# Patient Record
Sex: MALE | Race: WHITE
[De-identification: names, ages, dates, MRNs, and addresses within clinical notes are randomized per-mention and may not be internally consistent; named-entity substitution may affect disease eponyms.]

---

## 2022-04-07 PROBLEM — Z00.00 ENCOUNTER FOR PREVENTIVE HEALTH EXAMINATION: Status: ACTIVE | Noted: 2022-04-07

## 2022-04-13 ENCOUNTER — APPOINTMENT (OUTPATIENT)
Dept: ORTHOPEDIC SURGERY | Facility: CLINIC | Age: 57
End: 2022-04-13

## 2022-04-20 ENCOUNTER — APPOINTMENT (OUTPATIENT)
Dept: ORTHOPEDIC SURGERY | Facility: CLINIC | Age: 57
End: 2022-04-20
Payer: MEDICAID

## 2022-04-20 VITALS — WEIGHT: 165 LBS | HEIGHT: 74 IN | BODY MASS INDEX: 21.17 KG/M2

## 2022-04-20 DIAGNOSIS — G56.21 LESION OF ULNAR NERVE, RIGHT UPPER LIMB: ICD-10-CM

## 2022-04-20 PROCEDURE — 99213 OFFICE O/P EST LOW 20 MIN: CPT

## 2022-04-20 NOTE — ASSESSMENT
[FreeTextEntry1] : - activity as tolerated.\par \par F/u PRN.\par He has f/u appointment with Dr. Zhou for his neck scheduled.\par Discussed f/u with Neurologist vs Pain Management doctor regarding medical management of burning pain.

## 2022-04-20 NOTE — IMAGING
[de-identified] : RUE\par - well healed scar s/p CuTR and CTR. no swelling.\par - atrophy of dorsal/volar forearm, thenar musculature, interossei.\par - no TTP.\par - elbow: good extension, flexion, pronation, supination.\par - hand: good EPL, very limited FPL. good finger extension, flex to full fist with asynchronous . \par -  4+/5, 1st DI 3/5, APB 1-2/5.\par - numbness of all digits and ulnar forearm. SILT to dorsal hand and radial forearm.\par - palpable radial pulse, brick cap refill all digits.\par - no triggering.

## 2022-04-20 NOTE — HISTORY OF PRESENT ILLNESS
[10] : 10 [1] : 2 [Burning] : burning [Dull/Aching] : dull/aching [Tingling] : tingling [Household chores] : household chores [Leisure] : leisure [Work] : work [Sleep] : sleep [Social interactions] : social interactions [Not working due to injury] : Work status: not working due to injury [de-identified] :  right hand, elbow surgery one year ago  [] : no [FreeTextEntry9] : tylenol pm  [de-identified] : jessica  [de-identified] : 1 year ago

## 2022-04-20 NOTE — REASON FOR VISIT
[FreeTextEntry2] : 4/20/22: 1y s/p RIGHT CTR and ulnar nerve intramuscular transposition on 4/26/21. complain of burning throughout forearm, sometimes makes it difficult to sleep. intermittent sensitivity over posterior elbow. unable to lift more than 10lbs.\par \par 10/27/21: 6mo s/p RIGHT CTR and ulnar nerve intramuscular transposition on 4/26/21. He continues to have weakness and pain/numbness to the posterior elbow when resting on it. He has been doing ADL's but has trouble holding the laundry basket due to weakness.\par \par 7/28/21: 3mo s/p RIGHT CTR and ulnar nerve intramuscular transposition on 4/26/21. reports intermittent elbow and wrist pain, sometimes with activity, sometimes at rest. still needs to elevate his RIGHT arm above his head sometimes to relieve some of the symptoms.\par \par 6/16/21: 7wks s/p RIGHT CTR and ulnar nerve intramuscular transposition on 4/26/21. mild pain, overall doing well. continues to c/o hand weakness, but the constant "banging on the funny bone at the elbow" has resolved. also c/o burning pain from upper arm radiating to forearm.\par \par 5/18/21: 3wks s/p RIGHT CTR and ulnar nerve intramuscular transposition on 4/26/21. reports intermittent pain at surgical sites. swelling and bruising has decreased. wearing compression glove. wrist brace.\par \par 5/4/21: 8d s/p RIGHT CTR and ulnar nerve intramuscular transposition on 4/26/21. pain well controlled with Tylenol. no f/c or ill sx.\par \par 3/4/21: f/u RIGHT CTS/CuTS. He presents to discuss surgery.\par \par 2/25/21: f/u RIGHT CTS/CuTS. He feels that his RIGHT hand weakness is worsening. Reports intermittent subjective swelling and tightness, and his "fingers feel like they're going to break."\par He is seeing Dr. Gamboa for his neck.\par \par 10/28/20: 54yo LHD M presents for RIGHT arm. Reports pain radiating from shoulder blade to posterior upper arm to ulnar forearm x 2mo. Associated with atrophy of arm and weakness of hand. No injury. Hx C7-T1 fusion. Saw Dr. Zhou => NCS.\par \par NCS 10/14/20 - IMPRESSION: R C7/C8/T1 radiculopathy with acute and chronic denervation. moderate R CTS. moderate C CuTS.\par - R median: DML 7.2ms, DSL 4.8ms.\par - R ulnar: 29m/s above to below elbow, 46m/s below elbow to wrist.

## 2022-04-27 ENCOUNTER — FORM ENCOUNTER (OUTPATIENT)
Age: 57
End: 2022-04-27

## 2022-05-04 ENCOUNTER — FORM ENCOUNTER (OUTPATIENT)
Age: 57
End: 2022-05-04

## 2022-05-10 ENCOUNTER — APPOINTMENT (OUTPATIENT)
Dept: ORTHOPEDIC SURGERY | Facility: CLINIC | Age: 57
End: 2022-05-10
Payer: MEDICAID

## 2022-05-10 DIAGNOSIS — Z78.9 OTHER SPECIFIED HEALTH STATUS: ICD-10-CM

## 2022-05-10 PROCEDURE — 99214 OFFICE O/P EST MOD 30 MIN: CPT

## 2022-05-10 NOTE — IMAGING
[de-identified] : Right Shoulder: Palpation of the shoulder/upper arm is as follows: no tenderness to palpation. Range of motion of the\par shoulder is as follows: There is good motion and There are no symptoms.\par  \par Left Shoulder: Range of motion of the shoulder is as follows: There is good motion and There are no symptoms. \par \par Right Elbow: Special Testing of the elbow/arm is as follows: positive Tinel's test at cubital tunnel and positive\par hyperflexion test at the elbow \par \par Left Elbow: Special Testing of the elbow/arm is as follows: negative Tinel's test at elbow \par \par Right Hand: Inspection of the hand/wrist is as follows: Intrinsic wasting Special testing of the hand/wrist is as\par follows: Reports altered sensation in thumb, IF, LF, RF +Froment sign \par \par Left Hand: Special testing of the hand/wrist is as follows: negative compression test. \par \par Neck: \par Inspection of the cervical spine is as follows: no erythema and no rashes. Palpation of the cervical spine is as\par follows: right rhomboid tenderness. Range of motion of the cervical spine is as follows: diminished range of\par motion in all planes Strength Testing for the cervical spine is as follows: Right Finger Abductors 3/5 Right\par Grasp 3/5 R WF/WE 4/5. Subjective decrease sensation to light touch at: right dorsal and ulnar forearm Left Deltoid strength 5/5, Right\par Deltoid strength 5/5, Left Biceps 5/5, Right Biceps 5/5, Left Triceps 5/5, Right Triceps 5/5, Left Wrist Flexors 5/5, Right\par Wrist Flexors 5/5, Left Finger Abductors 5/5 and Left Grasp 5/5 Neurological testing for the cervical spine is as\par follows: normal deep tendon reflexes bilateral upper extremities and negative Saxena reflex

## 2022-05-10 NOTE — ASSESSMENT
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 \par Discussed both operative and non-operative management including interventions centrally now that he has undergone RUE peripheral decompression. \par Patient would like to avoid additional cervical/thoracic surgery at all costs. \par Agree with Hand in that peripheral decompression may not address the entirety of his issues\par Discussed possibility he may have underlying N/T, pain, and weakness that would not be entirely addressed by\par peripheral decompression, and that C-T spine surgery may still be an option to address the above\par Discussed Pain referral should he have flair of radicular pain

## 2022-05-10 NOTE — HISTORY OF PRESENT ILLNESS
[Neck] : neck [6] : 6 [5] : 5 [de-identified] : Hand:\par RIGHT CTR and ulnar nerve intramuscular transposition on 4/26/21\par \par LHDM. \par In the interim has seen Hand. \par His main complaint is now the N/T and cramping sensation in the ulnar forearm and ulnar digits. \par Still reporting:\par Neck pain with sharp radiating pain to Right posterior shoulder, arm, to the forearm. \par Reports numbness in the R ulnar forearm. \par No LUE symptoms of radicular pain, N/T. \par Reports R hand weakness and he noticed muscle mass loss in the R hand. \par No bowel/bladder incontinence. No gait instability or dexterity issues other than his reported hand weakness.\par 3/1/21- symptoms still the same, has seen Hand and discussed decompression of R CTS, CuTS\par 5/24/21- Reports the pain, N/T radiating down the R ulnar forearm is much improved, still has weakness.\par 6/28/21- continued RUE radic. sx over posterior upper arm to the forearm, still with weakness.\par 5/10/22- Neck pain the same since last visit. Still RUE radic through scapula over posterior arm, with burning pain in forearm. R hand pain/finger cramping/N/T, with decreased  strength in right hand. [de-identified] : PT for

## 2022-05-20 ENCOUNTER — APPOINTMENT (OUTPATIENT)
Dept: ORTHOPEDIC SURGERY | Facility: CLINIC | Age: 57
End: 2022-05-20

## 2023-09-06 ENCOUNTER — APPOINTMENT (OUTPATIENT)
Dept: ORTHOPEDIC SURGERY | Facility: CLINIC | Age: 58
End: 2023-09-06
Payer: MEDICAID

## 2023-09-06 DIAGNOSIS — G56.01 CARPAL TUNNEL SYNDROME, RIGHT UPPER LIMB: ICD-10-CM

## 2023-09-06 DIAGNOSIS — Z98.890 OTHER SPECIFIED POSTPROCEDURAL STATES: ICD-10-CM

## 2023-09-06 DIAGNOSIS — M54.12 RADICULOPATHY, CERVICAL REGION: ICD-10-CM

## 2023-09-06 PROCEDURE — 99213 OFFICE O/P EST LOW 20 MIN: CPT

## 2023-09-06 PROCEDURE — 72040 X-RAY EXAM NECK SPINE 2-3 VW: CPT

## 2023-09-06 NOTE — REASON FOR VISIT
[FreeTextEntry2] : 09/06/2023 :HENRY GOLDBERG , a 58 year old male, presents today for cervical  pain

## 2023-09-06 NOTE — HISTORY OF PRESENT ILLNESS
[de-identified] : 9/6/23- RUE symptoms the same. Also noted nodule on R palm.  [Neck] : neck [6] : 6 [Dull/Aching] : dull/aching [Localized] : localized [Radiating] : radiating [Sharp] : sharp [Tingling] : tingling [Constant] : constant [Household chores] : household chores [Leisure] : leisure [Work] : work [Sleep] : sleep [Sexual activity] : sexual activity [Social interactions] : social interactions [Nothing helps with pain getting better] : Nothing helps with pain getting better [Exercising] : exercising [] : no

## 2023-09-06 NOTE — IMAGING
[de-identified] : R hand:  Intrinsic wasting  Reports altered sensation thumb, IF, LF, RF,  +Froment sign  CSPINE Inspection: No rash or ecchymosis Palpation: No spasm in traps, rhomboids, paracervicals ROM: Full with stiffness Strength: 5/5 LEFT deltoid, biceps, triceps, wrist flexors, wrist extensors, , abductors. 5/5 Right bi, tri, 4/5 WF/WE, 3/5 abductors and  Sensation: Sensation present to light touch bilateral C5-T1 distributions Reflexes: Negative Saxena's bilaterally [Straightening consistent with spasm] : Straightening consistent with spasm [Facet arthropathy] : Facet arthropathy [Disc space narrowing] : Disc space narrowing [FreeTextEntry1] : uninstrumented fusion

## 2024-11-05 ENCOUNTER — APPOINTMENT (OUTPATIENT)
Dept: ORTHOPEDIC SURGERY | Facility: CLINIC | Age: 59
End: 2024-11-05
Payer: MEDICARE

## 2024-11-05 VITALS — HEIGHT: 74 IN | BODY MASS INDEX: 21.17 KG/M2 | WEIGHT: 165 LBS

## 2024-11-05 DIAGNOSIS — M54.12 RADICULOPATHY, CERVICAL REGION: ICD-10-CM

## 2024-11-05 DIAGNOSIS — G56.21 LESION OF ULNAR NERVE, RIGHT UPPER LIMB: ICD-10-CM

## 2024-11-05 DIAGNOSIS — Z98.890 OTHER SPECIFIED POSTPROCEDURAL STATES: ICD-10-CM

## 2024-11-05 PROCEDURE — 99203 OFFICE O/P NEW LOW 30 MIN: CPT
